# Patient Record
(demographics unavailable — no encounter records)

---

## 2025-02-18 NOTE — PHYSICAL EXAM
[Chaperone Present] : A chaperone was present in the examining room during all aspects of the physical examination [General Appearance - Well Developed] : well developed [General Appearance - Well Nourished] : well nourished [Heart Rate And Rhythm] : heart rate and rhythm were normal [] : no respiratory distress [Abdomen Soft] : soft [Abdomen Tenderness] : non-tender [Abdomen Hernia] : no hernia was discovered [Urethral Meatus] : meatus normal [Penis Abnormality] : normal circumcised penis [Epididymis] : the epididymides were normal [Testes Tenderness] : no tenderness of the testes [Testes Mass (___cm)] : there were no testicular masses [Normal Station and Gait] : the gait and station were normal for the patient's age [Skin Color & Pigmentation] : normal skin color and pigmentation [No Focal Deficits] : no focal deficits [Oriented To Time, Place, And Person] : oriented to person, place, and time [Not Anxious] : not anxious [FreeTextEntry2] : Gila Lyle NP

## 2025-02-18 NOTE — ASSESSMENT
[FreeTextEntry1] : 76 yo male with Non bothersome LUTS. He does not need medication at this time. We will check his annual PSA. If wnl he will return in 12 months.  - PSA  Results by phone Follow up 1 year

## 2025-02-18 NOTE — ADDENDUM
[FreeTextEntry1] : I, Dr. Devine, personally performed the evaluation and management (E/M) services for this established patient who presents today with (a) new problem(s)/exacerbation of (an) existing condition(s).  That E/M includes conducting the examination, assessing all new/exacerbated conditions, and establishing a new plan of care.  Today, my ACP, Gila Lyle, was here to observe my evaluation and management services for this new problem/exacerbated condition to be followed going forward.

## 2025-02-18 NOTE — HISTORY OF PRESENT ILLNESS
[FreeTextEntry1] : 4/2/21: 70 yo male presents for annual prostate cancer screening.  He has not had a PSA in some time.  He thinks his last PSA was a few years ago and may have been around 4.  He is generally happy with his voiding.  He does note nocturia 1-2 times at night and his urine stream is slower than it has been in the past, but this does not bother him.   ************ 9/9/22: Patient returns for follow up.  He continues to have mild, non-bothersome LUTS.  He is due for his annual PSA today.  He would like to start taking Propecia for hair loss.  PSA 4/2/21 = 3.58  ************** 2/8/24: Patient is voiding at his baseline.  He is not bothered by the voiding at this time.  He is due for his annual PSA.    PSA (9/9/22) = 3.92  ************* 2/18/25:  PSA (2/8/24) = 2.43 He is on Propecia. Feels well today with no bothersome LUTS. Denies dysuria and hematuria.